# Patient Record
Sex: MALE | Race: BLACK OR AFRICAN AMERICAN | NOT HISPANIC OR LATINO | URBAN - METROPOLITAN AREA
[De-identification: names, ages, dates, MRNs, and addresses within clinical notes are randomized per-mention and may not be internally consistent; named-entity substitution may affect disease eponyms.]

---

## 2018-02-20 ENCOUNTER — EMERGENCY (EMERGENCY)
Facility: HOSPITAL | Age: 38
LOS: 1 days | Discharge: HOME | End: 2018-02-20
Attending: EMERGENCY MEDICINE | Admitting: EMERGENCY MEDICINE
Payer: SELF-PAY

## 2018-02-20 VITALS
SYSTOLIC BLOOD PRESSURE: 121 MMHG | TEMPERATURE: 98 F | HEART RATE: 90 BPM | DIASTOLIC BLOOD PRESSURE: 81 MMHG | RESPIRATION RATE: 16 BRPM | OXYGEN SATURATION: 100 %

## 2018-02-20 PROCEDURE — 71046 X-RAY EXAM CHEST 2 VIEWS: CPT | Mod: 26

## 2018-02-20 PROCEDURE — 99283 EMERGENCY DEPT VISIT LOW MDM: CPT

## 2018-02-20 NOTE — ED ADULT TRIAGE NOTE - CHIEF COMPLAINT QUOTE
pt c/o cough, URI symptoms since Friday, states that he sees blood when he blows his nose since yesterday.  Pt also states that he took one tab of Amoxicillin yesterday that he had in his house.  Denies PMH

## 2018-02-20 NOTE — ED PROVIDER NOTE - OBJECTIVE STATEMENT
38 y/o M w/ no significant PMHx, presents to the ED c/o subjective fever, productive cough w/ phlegm and congestion x6 days. Pt also reports 2 episodes of epistaxis lasting a few seconds. Pt has been seen by PMD and advised to take Tylenol. Endorses use of Tylenol w/ some relief of fever. No flu shot this year. Denies abd pain, nausea, vomiting, diarrhea or any other complaints.

## 2021-05-05 ENCOUNTER — APPOINTMENT (OUTPATIENT)
Dept: DISASTER EMERGENCY | Facility: OTHER | Age: 41
End: 2021-05-05
Payer: COMMERCIAL

## 2021-05-05 PROCEDURE — 0001A: CPT

## 2021-05-26 ENCOUNTER — APPOINTMENT (OUTPATIENT)
Dept: DISASTER EMERGENCY | Facility: OTHER | Age: 41
End: 2021-05-26
Payer: COMMERCIAL

## 2021-05-26 PROCEDURE — 0002A: CPT

## 2021-07-11 NOTE — ED PROVIDER NOTE - MEDICAL DECISION MAKING DETAILS
38 y/o M w/ no significant PMhx, presents to the ED c/o URI sx x5 days. Likely Influenza-like illness. Will obtain CXR to r/o PNA given diminished breath sounds, saline nasal spray for dry nasal mucosa and recommend supportive care. Statement Selected